# Patient Record
Sex: FEMALE | Race: NATIVE HAWAIIAN OR OTHER PACIFIC ISLANDER
[De-identification: names, ages, dates, MRNs, and addresses within clinical notes are randomized per-mention and may not be internally consistent; named-entity substitution may affect disease eponyms.]

---

## 2018-06-04 ENCOUNTER — HOSPITAL ENCOUNTER (OUTPATIENT)
Dept: HOSPITAL 67 - LABW | Age: 83
Discharge: HOME | End: 2018-06-04
Attending: INTERNAL MEDICINE
Payer: COMMERCIAL

## 2018-06-04 DIAGNOSIS — Z51.81: ICD-10-CM

## 2018-06-04 DIAGNOSIS — N18.4: Primary | ICD-10-CM

## 2018-06-04 DIAGNOSIS — Z79.899: ICD-10-CM

## 2018-06-04 LAB
PLATELET # BLD: 211 K/UL (ref 152–353)
POTASSIUM SERPL-SCNC: 5.2 MMOL/L (ref 3.6–5.2)
SODIUM SERPL-SCNC: 130 MMOL/L (ref 136–145)

## 2018-06-04 PROCEDURE — 83735 ASSAY OF MAGNESIUM: CPT

## 2018-06-04 PROCEDURE — 86430 RHEUMATOID FACTOR TEST QUAL: CPT

## 2018-06-04 PROCEDURE — 83036 HEMOGLOBIN GLYCOSYLATED A1C: CPT

## 2018-06-04 PROCEDURE — 80053 COMPREHEN METABOLIC PANEL: CPT

## 2018-06-04 PROCEDURE — 83540 ASSAY OF IRON: CPT

## 2018-06-04 PROCEDURE — 82306 VITAMIN D 25 HYDROXY: CPT

## 2018-06-04 PROCEDURE — 85027 COMPLETE CBC AUTOMATED: CPT

## 2018-06-04 PROCEDURE — 81000 URINALYSIS NONAUTO W/SCOPE: CPT

## 2018-06-04 PROCEDURE — 83550 IRON BINDING TEST: CPT

## 2018-06-04 PROCEDURE — 82043 UR ALBUMIN QUANTITATIVE: CPT

## 2018-06-04 PROCEDURE — 84550 ASSAY OF BLOOD/URIC ACID: CPT

## 2018-06-04 PROCEDURE — 36415 COLL VENOUS BLD VENIPUNCTURE: CPT

## 2018-06-04 PROCEDURE — 83970 ASSAY OF PARATHORMONE: CPT

## 2018-06-04 PROCEDURE — 84100 ASSAY OF PHOSPHORUS: CPT

## 2018-06-04 PROCEDURE — 82330 ASSAY OF CALCIUM: CPT

## 2018-06-04 PROCEDURE — 82570 ASSAY OF URINE CREATININE: CPT

## 2018-06-04 PROCEDURE — 82607 VITAMIN B-12: CPT

## 2018-06-04 PROCEDURE — 82746 ASSAY OF FOLIC ACID SERUM: CPT

## 2018-06-04 PROCEDURE — 82728 ASSAY OF FERRITIN: CPT

## 2018-09-28 ENCOUNTER — HOSPITAL ENCOUNTER (OUTPATIENT)
Dept: HOSPITAL 67 - LABW | Age: 83
Discharge: HOME | End: 2018-09-28
Attending: INTERNAL MEDICINE
Payer: COMMERCIAL

## 2018-09-28 DIAGNOSIS — Z79.899: ICD-10-CM

## 2018-09-28 DIAGNOSIS — E53.8: ICD-10-CM

## 2018-09-28 DIAGNOSIS — N18.4: Primary | ICD-10-CM

## 2018-09-28 LAB
PLATELET # BLD: 265 K/UL (ref 152–353)
POTASSIUM SERPL-SCNC: 5.4 MMOL/L (ref 3.6–5.2)
SODIUM SERPL-SCNC: 130 MMOL/L (ref 136–145)

## 2018-09-28 PROCEDURE — 80053 COMPREHEN METABOLIC PANEL: CPT

## 2018-09-28 PROCEDURE — 82306 VITAMIN D 25 HYDROXY: CPT

## 2018-09-28 PROCEDURE — 82746 ASSAY OF FOLIC ACID SERUM: CPT

## 2018-09-28 PROCEDURE — 82607 VITAMIN B-12: CPT

## 2018-09-28 PROCEDURE — 84100 ASSAY OF PHOSPHORUS: CPT

## 2018-09-28 PROCEDURE — 85027 COMPLETE CBC AUTOMATED: CPT

## 2018-09-28 PROCEDURE — 36415 COLL VENOUS BLD VENIPUNCTURE: CPT

## 2018-09-28 PROCEDURE — 84443 ASSAY THYROID STIM HORMONE: CPT

## 2018-09-28 PROCEDURE — 83970 ASSAY OF PARATHORMONE: CPT

## 2018-09-28 PROCEDURE — 84439 ASSAY OF FREE THYROXINE: CPT

## 2018-09-28 PROCEDURE — 84550 ASSAY OF BLOOD/URIC ACID: CPT

## 2019-01-11 ENCOUNTER — HOSPITAL ENCOUNTER (OUTPATIENT)
Dept: HOSPITAL 67 - LABW | Age: 84
Discharge: HOME | End: 2019-01-11
Attending: INTERNAL MEDICINE
Payer: COMMERCIAL

## 2019-01-11 DIAGNOSIS — E53.8: ICD-10-CM

## 2019-01-11 DIAGNOSIS — Z79.899: ICD-10-CM

## 2019-01-11 DIAGNOSIS — N18.4: Primary | ICD-10-CM

## 2019-01-11 LAB
PLATELET # BLD: 280 K/UL (ref 152–353)
POTASSIUM SERPL-SCNC: 5.4 MMOL/L (ref 3.6–5.2)
SODIUM SERPL-SCNC: 135 MMOL/L (ref 136–145)

## 2019-01-11 PROCEDURE — 84443 ASSAY THYROID STIM HORMONE: CPT

## 2019-01-11 PROCEDURE — 80053 COMPREHEN METABOLIC PANEL: CPT

## 2019-01-11 PROCEDURE — 85027 COMPLETE CBC AUTOMATED: CPT

## 2019-01-11 PROCEDURE — 36415 COLL VENOUS BLD VENIPUNCTURE: CPT

## 2019-01-11 PROCEDURE — 83735 ASSAY OF MAGNESIUM: CPT

## 2019-01-11 PROCEDURE — 84439 ASSAY OF FREE THYROXINE: CPT

## 2019-01-11 PROCEDURE — 84550 ASSAY OF BLOOD/URIC ACID: CPT

## 2019-01-11 PROCEDURE — 82306 VITAMIN D 25 HYDROXY: CPT

## 2019-01-11 PROCEDURE — 83970 ASSAY OF PARATHORMONE: CPT

## 2019-01-11 PROCEDURE — 82746 ASSAY OF FOLIC ACID SERUM: CPT

## 2019-01-11 PROCEDURE — 82607 VITAMIN B-12: CPT

## 2019-01-11 PROCEDURE — 84100 ASSAY OF PHOSPHORUS: CPT

## 2019-05-06 ENCOUNTER — HOSPITAL ENCOUNTER (OUTPATIENT)
Dept: HOSPITAL 67 - LABW | Age: 84
Discharge: HOME | End: 2019-05-06
Attending: INTERNAL MEDICINE
Payer: COMMERCIAL

## 2019-05-06 DIAGNOSIS — E53.8: ICD-10-CM

## 2019-05-06 DIAGNOSIS — N18.4: Primary | ICD-10-CM

## 2019-05-06 DIAGNOSIS — Z79.899: ICD-10-CM

## 2019-05-06 LAB
PLATELET # BLD: 291 K/UL (ref 152–353)
POTASSIUM SERPL-SCNC: 4.6 MMOL/L (ref 3.6–5.2)
SODIUM SERPL-SCNC: 136 MMOL/L (ref 136–145)

## 2019-05-06 PROCEDURE — 83970 ASSAY OF PARATHORMONE: CPT

## 2019-05-06 PROCEDURE — 82746 ASSAY OF FOLIC ACID SERUM: CPT

## 2019-05-06 PROCEDURE — 84443 ASSAY THYROID STIM HORMONE: CPT

## 2019-05-06 PROCEDURE — 84550 ASSAY OF BLOOD/URIC ACID: CPT

## 2019-05-06 PROCEDURE — 84100 ASSAY OF PHOSPHORUS: CPT

## 2019-05-06 PROCEDURE — 80053 COMPREHEN METABOLIC PANEL: CPT

## 2019-05-06 PROCEDURE — 36415 COLL VENOUS BLD VENIPUNCTURE: CPT

## 2019-05-06 PROCEDURE — 85027 COMPLETE CBC AUTOMATED: CPT

## 2019-05-06 PROCEDURE — 83735 ASSAY OF MAGNESIUM: CPT

## 2019-05-06 PROCEDURE — 82306 VITAMIN D 25 HYDROXY: CPT

## 2019-05-06 PROCEDURE — 84439 ASSAY OF FREE THYROXINE: CPT

## 2019-08-02 ENCOUNTER — HOSPITAL ENCOUNTER (OUTPATIENT)
Dept: HOSPITAL 67 - LABW | Age: 84
Discharge: HOME | End: 2019-08-02
Attending: INTERNAL MEDICINE
Payer: COMMERCIAL

## 2019-08-02 DIAGNOSIS — E55.9: ICD-10-CM

## 2019-08-02 DIAGNOSIS — I12.9: Primary | ICD-10-CM

## 2019-08-02 DIAGNOSIS — I25.10: ICD-10-CM

## 2019-08-02 DIAGNOSIS — N18.4: ICD-10-CM

## 2019-08-02 DIAGNOSIS — M1A.0720: ICD-10-CM

## 2019-08-02 LAB
PLATELET # BLD: 216 K/UL (ref 152–353)
POTASSIUM SERPL-SCNC: 5.4 MMOL/L (ref 3.6–5.2)
SODIUM SERPL-SCNC: 135 MMOL/L (ref 136–145)

## 2019-08-02 PROCEDURE — 83735 ASSAY OF MAGNESIUM: CPT

## 2019-08-02 PROCEDURE — 84550 ASSAY OF BLOOD/URIC ACID: CPT

## 2019-08-02 PROCEDURE — 81000 URINALYSIS NONAUTO W/SCOPE: CPT

## 2019-08-02 PROCEDURE — 82043 UR ALBUMIN QUANTITATIVE: CPT

## 2019-08-02 PROCEDURE — 84155 ASSAY OF PROTEIN SERUM: CPT

## 2019-08-02 PROCEDURE — 85027 COMPLETE CBC AUTOMATED: CPT

## 2019-08-02 PROCEDURE — 82306 VITAMIN D 25 HYDROXY: CPT

## 2019-08-02 PROCEDURE — 84443 ASSAY THYROID STIM HORMONE: CPT

## 2019-08-02 PROCEDURE — 80053 COMPREHEN METABOLIC PANEL: CPT

## 2019-08-02 PROCEDURE — 84100 ASSAY OF PHOSPHORUS: CPT

## 2019-08-02 PROCEDURE — 82570 ASSAY OF URINE CREATININE: CPT

## 2019-08-02 PROCEDURE — 36415 COLL VENOUS BLD VENIPUNCTURE: CPT

## 2019-08-29 ENCOUNTER — HOSPITAL ENCOUNTER (OUTPATIENT)
Dept: HOSPITAL 67 - NM | Age: 84
Discharge: HOME | End: 2019-08-29
Attending: INTERNAL MEDICINE
Payer: COMMERCIAL

## 2019-08-29 DIAGNOSIS — I25.10: Primary | ICD-10-CM

## 2019-08-29 PROCEDURE — A9500 TC99M SESTAMIBI: HCPCS

## 2019-08-29 PROCEDURE — 93306 TTE W/DOPPLER COMPLETE: CPT

## 2022-04-05 ENCOUNTER — TELEPHONE ENCOUNTER (OUTPATIENT)
Dept: URBAN - METROPOLITAN AREA CLINIC 113 | Facility: CLINIC | Age: 87
End: 2022-04-05

## 2022-04-07 ENCOUNTER — LAB OUTSIDE AN ENCOUNTER (OUTPATIENT)
Dept: URBAN - METROPOLITAN AREA CLINIC 113 | Facility: CLINIC | Age: 87
End: 2022-04-07

## 2022-04-07 ENCOUNTER — TELEPHONE ENCOUNTER (OUTPATIENT)
Dept: URBAN - METROPOLITAN AREA CLINIC 113 | Facility: CLINIC | Age: 87
End: 2022-04-07

## 2022-04-08 ENCOUNTER — OFFICE VISIT (OUTPATIENT)
Dept: URBAN - METROPOLITAN AREA CLINIC 113 | Facility: CLINIC | Age: 87
End: 2022-04-08
Payer: MEDICARE

## 2022-04-08 ENCOUNTER — DASHBOARD ENCOUNTERS (OUTPATIENT)
Age: 87
End: 2022-04-08

## 2022-04-08 ENCOUNTER — WEB ENCOUNTER (OUTPATIENT)
Dept: URBAN - METROPOLITAN AREA CLINIC 113 | Facility: CLINIC | Age: 87
End: 2022-04-08

## 2022-04-08 ENCOUNTER — LAB OUTSIDE AN ENCOUNTER (OUTPATIENT)
Dept: URBAN - METROPOLITAN AREA CLINIC 113 | Facility: CLINIC | Age: 87
End: 2022-04-08

## 2022-04-08 VITALS
WEIGHT: 125 LBS | HEIGHT: 64 IN | DIASTOLIC BLOOD PRESSURE: 54 MMHG | SYSTOLIC BLOOD PRESSURE: 154 MMHG | HEART RATE: 64 BPM | BODY MASS INDEX: 21.34 KG/M2 | TEMPERATURE: 98.2 F

## 2022-04-08 DIAGNOSIS — K80.50 CHOLEDOCHOLITHIASIS: ICD-10-CM

## 2022-04-08 PROCEDURE — 99204 OFFICE O/P NEW MOD 45 MIN: CPT | Performed by: INTERNAL MEDICINE

## 2022-04-08 RX ORDER — ALLOPURINOL 100 MG/1
1 TABLET TABLET ORAL ONCE A DAY
Status: ACTIVE | COMMUNITY

## 2022-04-08 RX ORDER — PANTOPRAZOLE SODIUM 40 MG/1
1 TABLET TABLET, DELAYED RELEASE ORAL ONCE A DAY
Status: ACTIVE | COMMUNITY

## 2022-04-08 RX ORDER — MULTIVITAMIN
1 TABLET TABLET ORAL ONCE A DAY
Status: ACTIVE | COMMUNITY

## 2022-04-08 RX ORDER — ATENOLOL 25 MG/1
1 TABLET TABLET ORAL ONCE A DAY
Status: ACTIVE | COMMUNITY

## 2022-04-08 RX ORDER — LISINOPRIL 40 MG/1
1 TABLET TABLET ORAL ONCE A DAY
Status: ACTIVE | COMMUNITY

## 2022-04-08 RX ORDER — AMLODIPINE BESYLATE 5 MG/1
1 TABLET TABLET ORAL ONCE A DAY
Status: ACTIVE | COMMUNITY

## 2022-04-08 RX ORDER — HYDRALAZINE HYDROCHLORIDE 25 MG/1
1 TABLET WITH FOOD TABLET, FILM COATED ORAL THREE TIMES A DAY
Status: ACTIVE | COMMUNITY

## 2022-04-08 RX ORDER — ASPIRIN 81 MG/1
1 TABLET TABLET ORAL ONCE A DAY
Status: ACTIVE | COMMUNITY

## 2022-04-08 RX ORDER — PRAVASTATIN SODIUM 40 MG/1
1 TABLET TABLET ORAL ONCE A DAY
Status: ACTIVE | COMMUNITY

## 2022-04-08 NOTE — HPI-TODAY'S VISIT:
Patient is a 89-year-old female referred for choledocholithiasis.  She had a recent MRCP at outside facility showed extensive common bile duct stones. History is obtained through the daughter as the patient has limited English capabilities.  According to the daughter the patient's had intermittent right-sided abdominal pain that radiates to her back over the past few months.  She went to the local emergency room where a CT scan was done and they told her she needed surgery and they went to a local surgeon.  The surgeon did ultrasound and said he did not do that kind of surgery and referred her to Dr. Brice here in [place].  He then referred her to me for possible ERCP.  An MRI was performed at outside facility which showed extensive stones in the bile duct.  According to the patient and the daughter a number of years ago she had a similar episode in [facility] where cholecystectomy was performed an ERCP was performed.  They do not know the results of the ERCP.  Recently she has noted her urine is turning green.  The daughter and the patient are very concerned about anesthesia risk since she is 89 years old with coronary artery disease.  She had a stress test done yesterday.  I discussed with him at length the high risk of anesthesia and the patient is 89 years old but overall she is quite healthy for her age.  She did have an episode of pancreatitis approximately 2 years ago they are not sure of the source.

## 2022-04-14 ENCOUNTER — OFFICE VISIT (OUTPATIENT)
Dept: URBAN - METROPOLITAN AREA MEDICAL CENTER 19 | Facility: MEDICAL CENTER | Age: 87
End: 2022-04-14
Payer: MEDICARE

## 2022-04-14 ENCOUNTER — TELEPHONE ENCOUNTER (OUTPATIENT)
Dept: URBAN - METROPOLITAN AREA CLINIC 113 | Facility: CLINIC | Age: 87
End: 2022-04-14

## 2022-04-14 DIAGNOSIS — R93.2 ABNORMAL BILIARY X-RAY: ICD-10-CM

## 2022-04-14 DIAGNOSIS — K31.89 ACQUIRED DEFORMITY OF PYLORUS: ICD-10-CM

## 2022-04-14 DIAGNOSIS — K80.50 COMMON BILE DUCT CALCULI: ICD-10-CM

## 2022-04-14 PROCEDURE — 43264 ERCP REMOVE DUCT CALCULI: CPT | Performed by: INTERNAL MEDICINE

## 2022-04-14 PROCEDURE — 43274 ERCP DUCT STENT PLACEMENT: CPT | Performed by: INTERNAL MEDICINE

## 2022-04-14 RX ORDER — ASPIRIN 81 MG/1
1 TABLET TABLET ORAL ONCE A DAY
Status: ACTIVE | COMMUNITY

## 2022-04-14 RX ORDER — PANTOPRAZOLE SODIUM 40 MG/1
1 TABLET TABLET, DELAYED RELEASE ORAL ONCE A DAY
Status: ACTIVE | COMMUNITY

## 2022-04-14 RX ORDER — ATENOLOL 25 MG/1
1 TABLET TABLET ORAL ONCE A DAY
Status: ACTIVE | COMMUNITY

## 2022-04-14 RX ORDER — PRAVASTATIN SODIUM 40 MG/1
1 TABLET TABLET ORAL ONCE A DAY
Status: ACTIVE | COMMUNITY

## 2022-04-14 RX ORDER — AMLODIPINE BESYLATE 5 MG/1
1 TABLET TABLET ORAL ONCE A DAY
Status: ACTIVE | COMMUNITY

## 2022-04-14 RX ORDER — AMOXICILLIN AND CLAVULANATE POTASSIUM 875; 125 MG/1; MG/1
1 TABLET TABLET, FILM COATED ORAL
Qty: 6 TABLET | Refills: 0 | OUTPATIENT

## 2022-04-14 RX ORDER — ALLOPURINOL 100 MG/1
1 TABLET TABLET ORAL ONCE A DAY
Status: ACTIVE | COMMUNITY

## 2022-04-14 RX ORDER — HYDRALAZINE HYDROCHLORIDE 25 MG/1
1 TABLET WITH FOOD TABLET, FILM COATED ORAL THREE TIMES A DAY
Status: ACTIVE | COMMUNITY

## 2022-04-14 RX ORDER — MULTIVITAMIN
1 TABLET TABLET ORAL ONCE A DAY
Status: ACTIVE | COMMUNITY

## 2022-04-14 RX ORDER — LISINOPRIL 40 MG/1
1 TABLET TABLET ORAL ONCE A DAY
Status: ACTIVE | COMMUNITY

## 2022-04-19 ENCOUNTER — TELEPHONE ENCOUNTER (OUTPATIENT)
Dept: URBAN - METROPOLITAN AREA CLINIC 113 | Facility: CLINIC | Age: 87
End: 2022-04-19

## 2022-04-21 ENCOUNTER — TELEPHONE ENCOUNTER (OUTPATIENT)
Dept: URBAN - METROPOLITAN AREA CLINIC 113 | Facility: CLINIC | Age: 87
End: 2022-04-21

## 2022-04-21 ENCOUNTER — LAB OUTSIDE AN ENCOUNTER (OUTPATIENT)
Dept: URBAN - METROPOLITAN AREA CLINIC 113 | Facility: CLINIC | Age: 87
End: 2022-04-21

## 2022-06-01 ENCOUNTER — CLAIMS CREATED FROM THE CLAIM WINDOW (OUTPATIENT)
Dept: URBAN - METROPOLITAN AREA MEDICAL CENTER 19 | Facility: MEDICAL CENTER | Age: 87
End: 2022-06-01
Payer: MEDICARE

## 2022-06-01 ENCOUNTER — TELEPHONE ENCOUNTER (OUTPATIENT)
Dept: URBAN - METROPOLITAN AREA CLINIC 113 | Facility: CLINIC | Age: 87
End: 2022-06-01

## 2022-06-01 DIAGNOSIS — T18.3XXA FOREIGN BODY IN INTESTINE AND COLON: ICD-10-CM

## 2022-06-01 DIAGNOSIS — K80.50 CALCULUS OF BILE DUCT WITHOUT CHOLANGITIS OR CHOLECYSTITIS WITHOUT OBSTRUCTION: ICD-10-CM

## 2022-06-01 DIAGNOSIS — K80.50 BILE DUCT CALCULUS: ICD-10-CM

## 2022-06-01 DIAGNOSIS — Z46.59 ENCOUNTER FOR FITTING AND ADJUSTMENT OF OTHER GASTROINTESTINAL APPLIANCE AND DEVICE: ICD-10-CM

## 2022-06-01 DIAGNOSIS — Z46.59 ENCOUNTER FOR NASOGASTRIC (NG) TUBE PLACEMENT: ICD-10-CM

## 2022-06-01 DIAGNOSIS — T18.3XXA FOREIGN BODY IN SMALL INTESTINE: ICD-10-CM

## 2022-06-01 PROCEDURE — 43275 ERCP REMOVE FORGN BODY DUCT: CPT | Performed by: INTERNAL MEDICINE

## 2022-06-01 PROCEDURE — 43264 ERCP REMOVE DUCT CALCULI: CPT | Performed by: INTERNAL MEDICINE

## 2022-06-01 RX ORDER — LISINOPRIL 40 MG/1
1 TABLET TABLET ORAL ONCE A DAY
Status: ACTIVE | COMMUNITY

## 2022-06-01 RX ORDER — MULTIVITAMIN
1 TABLET TABLET ORAL ONCE A DAY
Status: ACTIVE | COMMUNITY

## 2022-06-01 RX ORDER — ASPIRIN 81 MG/1
1 TABLET TABLET ORAL ONCE A DAY
Status: ACTIVE | COMMUNITY

## 2022-06-01 RX ORDER — AMLODIPINE BESYLATE 5 MG/1
1 TABLET TABLET ORAL ONCE A DAY
Status: ACTIVE | COMMUNITY

## 2022-06-01 RX ORDER — AMOXICILLIN AND CLAVULANATE POTASSIUM 875; 125 MG/1; MG/1
1 TABLET TABLET, FILM COATED ORAL
Qty: 6 TABLET | Refills: 0 | Status: ACTIVE | COMMUNITY

## 2022-06-01 RX ORDER — HYDRALAZINE HYDROCHLORIDE 25 MG/1
1 TABLET WITH FOOD TABLET, FILM COATED ORAL THREE TIMES A DAY
Status: ACTIVE | COMMUNITY

## 2022-06-01 RX ORDER — AMOXICILLIN AND CLAVULANATE POTASSIUM 875; 125 MG/1; MG/1
1 TABLET TABLET, FILM COATED ORAL
Qty: 6 TABLET | Refills: 0 | OUTPATIENT

## 2022-06-01 RX ORDER — PRAVASTATIN SODIUM 40 MG/1
1 TABLET TABLET ORAL ONCE A DAY
Status: ACTIVE | COMMUNITY

## 2022-06-01 RX ORDER — ATENOLOL 25 MG/1
1 TABLET TABLET ORAL ONCE A DAY
Status: ACTIVE | COMMUNITY

## 2022-06-01 RX ORDER — PANTOPRAZOLE SODIUM 40 MG/1
1 TABLET TABLET, DELAYED RELEASE ORAL ONCE A DAY
Status: ACTIVE | COMMUNITY

## 2022-06-01 RX ORDER — ALLOPURINOL 100 MG/1
1 TABLET TABLET ORAL ONCE A DAY
Status: ACTIVE | COMMUNITY